# Patient Record
Sex: FEMALE | Race: WHITE | NOT HISPANIC OR LATINO | URBAN - METROPOLITAN AREA
[De-identification: names, ages, dates, MRNs, and addresses within clinical notes are randomized per-mention and may not be internally consistent; named-entity substitution may affect disease eponyms.]

---

## 2022-07-21 ENCOUNTER — APPOINTMENT (OUTPATIENT)
Dept: URBAN - METROPOLITAN AREA CLINIC 186 | Age: 72
Setting detail: DERMATOLOGY
End: 2022-07-21

## 2022-07-21 DIAGNOSIS — L08.9 LOCAL INFECTION OF THE SKIN AND SUBCUTANEOUS TISSUE, UNSPECIFIED: ICD-10-CM

## 2022-07-21 PROCEDURE — OTHER DEFER: OTHER

## 2022-07-21 PROCEDURE — OTHER SUNSCREEN TREATMENT REGIMEN: OTHER

## 2022-07-21 PROCEDURE — 99204 OFFICE O/P NEW MOD 45 MIN: CPT

## 2022-07-21 PROCEDURE — OTHER DIAGNOSIS COMMENT: OTHER

## 2022-07-21 PROCEDURE — OTHER COUNSELING: OTHER

## 2022-07-21 PROCEDURE — OTHER PRESCRIPTION MEDICATION MANAGEMENT: OTHER

## 2022-07-21 PROCEDURE — OTHER ADDITIONAL NOTES: OTHER

## 2022-07-21 ASSESSMENT — LOCATION ZONE DERM: LOCATION ZONE: LIP

## 2022-07-21 ASSESSMENT — LOCATION DETAILED DESCRIPTION DERM: LOCATION DETAILED: LEFT SUPERIOR VERMILION BORDER

## 2022-07-21 ASSESSMENT — LOCATION SIMPLE DESCRIPTION DERM: LOCATION SIMPLE: LEFT UPPER LIP

## 2022-07-21 NOTE — PROCEDURE: PRESCRIPTION MEDICATION MANAGEMENT
Continue Regimen: Doxycycline
Render In Strict Bullet Format?: No
Detail Level: Zone
13-Oct-2019 00:52

## 2022-07-21 NOTE — PROCEDURE: DEFER
Other Procedure: incision and drainage
Introduction Text (Please End With A Colon): The following procedure was deferred:
Detail Level: Detailed

## 2022-07-21 NOTE — PROCEDURE: DIAGNOSIS COMMENT
Render Risk Assessment In Note?: no
Comment: Discussed condition. Discussed infection. Patient on doxy and amox clav from pcp states has improved after one day of meds.  Discussed continuing current regimen v i&d. Minimally fluctuant today. After discussing the patient prefers to continue oral,abx. Discussed concerning features to watch for.
Detail Level: Simple

## 2022-07-21 NOTE — PROCEDURE: ADDITIONAL NOTES
Render Risk Assessment In Note?: no
Detail Level: Simple
Additional Notes: Discussed a culture is not recommended today as patient has already been taking antibiotics.  Recommended to finish entire round of antibiotics and return to office if lesion persists.  Discussed incision and drainage If necessary in the future